# Patient Record
Sex: MALE | Race: WHITE | NOT HISPANIC OR LATINO | Employment: OTHER | URBAN - METROPOLITAN AREA
[De-identification: names, ages, dates, MRNs, and addresses within clinical notes are randomized per-mention and may not be internally consistent; named-entity substitution may affect disease eponyms.]

---

## 2024-01-18 PROBLEM — M79.604 LOW BACK PAIN RADIATING TO RIGHT LOWER EXTREMITY: Status: ACTIVE | Noted: 2024-01-18

## 2024-01-18 PROBLEM — S46.912A STRAIN OF LEFT SHOULDER: Status: ACTIVE | Noted: 2019-07-01

## 2024-01-18 PROBLEM — Z86.0100 HISTORY OF COLON POLYPS: Status: ACTIVE | Noted: 2024-01-18

## 2024-06-25 ENCOUNTER — OFFICE VISIT (OUTPATIENT)
Dept: PAIN MEDICINE | Facility: CLINIC | Age: 74
End: 2024-06-25
Payer: MEDICARE

## 2024-06-25 VITALS
RESPIRATION RATE: 15 BRPM | BODY MASS INDEX: 31.41 KG/M2 | TEMPERATURE: 97.3 F | HEART RATE: 67 BPM | DIASTOLIC BLOOD PRESSURE: 70 MMHG | WEIGHT: 160 LBS | SYSTOLIC BLOOD PRESSURE: 147 MMHG | OXYGEN SATURATION: 98 %

## 2024-06-25 DIAGNOSIS — M79.604 LOW BACK PAIN RADIATING TO RIGHT LOWER EXTREMITY: Primary | ICD-10-CM

## 2024-06-25 DIAGNOSIS — G89.29 CHRONIC BILATERAL LOW BACK PAIN WITH BILATERAL SCIATICA: ICD-10-CM

## 2024-06-25 DIAGNOSIS — M54.41 CHRONIC BILATERAL LOW BACK PAIN WITH BILATERAL SCIATICA: ICD-10-CM

## 2024-06-25 DIAGNOSIS — M51.26 LUMBAR DISC HERNIATION: ICD-10-CM

## 2024-06-25 DIAGNOSIS — M54.50 LOW BACK PAIN RADIATING TO RIGHT LOWER EXTREMITY: Primary | ICD-10-CM

## 2024-06-25 DIAGNOSIS — M54.42 CHRONIC BILATERAL LOW BACK PAIN WITH BILATERAL SCIATICA: ICD-10-CM

## 2024-06-25 PROCEDURE — 99214 OFFICE O/P EST MOD 30 MIN: CPT | Performed by: ANESTHESIOLOGY

## 2024-06-25 ASSESSMENT — ENCOUNTER SYMPTOMS
OCCASIONAL FEELINGS OF UNSTEADINESS: 0
LOSS OF SENSATION IN FEET: 0

## 2024-06-25 ASSESSMENT — PAIN SCALES - GENERAL: PAINLEVEL: 0-NO PAIN

## 2024-06-25 NOTE — PROGRESS NOTES
"History Of Present Illness  Dante López \"Avelina" is a 73 y.o. male presenting with   Chief Complaint   Patient presents with    Follow-up     Patient FOLLOWS up for significantly improved chronic low back pain to the RLE posteriorly.     Recall the pt reported his pain started in Oct/Nov of 2023. 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 The pain was constant, worse with activity and better with rest. The pain is sharp, stabbing and shooting to the B/L LE. Denies LE paresthesias, weakness, saddle anesthesia, bowel or bladder incontinence. To manage this pain the patient has attempted Gabapentin 100mg TID which was helpful initially and then wore off. The patients chronic DLD, HTN, NIDDM are stable on medication mgmt     PAIN SCORE: 0/10 was a 7/10.      PCP: Dr. Herring      PSHx  -None      SocHx  -Owned his own [employer] business  -Quit Tob in 1980's did smoke for 20 years   -Occ wine     Past Medical History  He has a past medical history of Personal history of other diseases of the circulatory system and Personal history of other endocrine, nutritional and metabolic disease.    Surgical History  He has a past surgical history that includes Other surgical history (08/07/2020).     Social History  He reports that he has never smoked. He has never used smokeless tobacco. He reports current alcohol use of about 21.0 standard drinks of alcohol per week. He reports current drug use. Drug: Marijuana.    Family History  No family history on file.     Allergies  Patient has no known allergies.    Review of Systems    All other systems reviewed and negative for any deficits. Pertinent positives and negatives were considered in the medical decision making process.        Physical Exam  /70   Pulse 67   Temp 36.3 °C (97.3 °F)   Resp 15   Wt 72.6 kg (160 lb)   SpO2 98%     General: Pt appears stated age     Eyes: Conjunctiva non-icteric and lids without " obvious rash or drooping. Pupils are symmetric     ENT: External ears are without deformity or rash. Hearing is grossly intact     Neck: No JVD noted, tracheal position midline.      Respiratory: No gasping or shortness of breath noted, no use of accessory muscles noted     Cardiovascular: Extremities show no edema or varicosities     Skin: No rashes or open lesions/ulcers identified on skin.     Musculoskeletal: Gait is grossly normal     Digits/nails show no clubbing or cyanosis     Exam of muscles/joints/bones shows no gross atrophy and no abnormal/involuntary movements in the head/neckNo asymmetry or masses noted in the head/neck     Stability: no subluxation noted on movement of bilateral upper extremities or head/neck     Strength: 5/5 in B/L upper and lower extremities      Range of Motion: WNL      Sensation: In tact      Cranial nerves 2-12 are grossly intact     Psychiatric: Pt is alert and oriented to time, place and person.          Assessment/Plan   1. Low back pain radiating to right lower extremity        2. Lumbar disc herniation        3. Chronic bilateral low back pain with bilateral sciatica              Diagnoses/Problems     · Encounter for preventive health examination (V70.0) (Z00.00)     · Lumbar neuritis (724.4) (M54.16)   · Lumbar disc herniation (722.10) (M51.26)   · Chronic low back pain (724.2,338.29) (M54.50,G89.29)     Provider Impressions     1. I have provided the patient with a list of physical therapy exercises to learn and perform to strengthen core, maintain stabilization, and reduce pain. We reviewed the exercises in detail and I encouraged them to perform them on a regular basis.     2. We discussed his Gabapentin 100mg TID to help with nerve related pain. We discussed the risks, benefits, and side effects to this medication including the mechanism of action and the pt understands and Will hold off for now since his radicular pain has decreased.      3. I extensively reviewed  the patients MRI findings in detail, including review of the actual images and provided a detailed explanation of the findings using a spine model.      There are right sided disc herniations at the L4/5 and L5/S1 level with S1 nerve root abutment. There is also grade I anterolisthesis at L3/4 and L2/3.      4. The patient is a candidate for an bilateral L5 LTR VERSUS  LESI at L5/S1 to treat back and radicular pain. I spent time with the patient discussing all of the risks, benefits, and alternatives to this measure. Including but not limited to spinal infection, epidural hematoma/abscess, paralysis, nerve injury, steroid effects, and spinal headache. The patient understands and agrees to proceed.    He underwent a RIGHT LTR ON 5- and he reported 100% relief of his pain that is ONGOING for now. Prior to that he underwent an LESI at L5/S1 on 3-1-2024 and he reported 100% relief of his pain that lasted several months and gradually wore off.      I spent time with the patient reviewing their imaging and discussing the risks benefits and alternatives to the above plan. A total of 30 minutes was spent reviewing the data and greater than 50% of that time was with the patient during the face to face encounter discussing treatment options both surgical, non-surgical, and minimally invasive techniques.    Haseeb Posey MD

## 2024-09-19 ENCOUNTER — LAB (OUTPATIENT)
Dept: LAB | Facility: LAB | Age: 74
End: 2024-09-19
Payer: MEDICARE

## 2024-09-19 DIAGNOSIS — E11.649 TYPE 2 DIABETES MELLITUS WITH HYPOGLYCEMIA WITHOUT COMA: ICD-10-CM

## 2024-09-19 DIAGNOSIS — I10 ESSENTIAL (PRIMARY) HYPERTENSION: ICD-10-CM

## 2024-09-19 DIAGNOSIS — E78.5 HYPERLIPIDEMIA, UNSPECIFIED: Primary | ICD-10-CM

## 2024-09-19 LAB
ANION GAP SERPL CALC-SCNC: 13 MMOL/L (ref 10–20)
BUN SERPL-MCNC: 13 MG/DL (ref 6–23)
CA-I BLD-SCNC: 1.2 MMOL/L (ref 1.1–1.33)
CALCIUM SERPL-MCNC: 9.4 MG/DL (ref 8.6–10.6)
CHLORIDE SERPL-SCNC: 99 MMOL/L (ref 98–107)
CHOLEST SERPL-MCNC: 147 MG/DL (ref 0–199)
CHOLESTEROL/HDL RATIO: 2.5
CO2 SERPL-SCNC: 29 MMOL/L (ref 21–32)
CREAT SERPL-MCNC: 0.84 MG/DL (ref 0.5–1.3)
CREAT UR-MCNC: 96.3 MG/DL (ref 20–370)
EGFRCR SERPLBLD CKD-EPI 2021: >90 ML/MIN/1.73M*2
GLUCOSE SERPL-MCNC: 152 MG/DL (ref 74–99)
HDLC SERPL-MCNC: 58.1 MG/DL
LDLC SERPL CALC-MCNC: 64 MG/DL
MICROALBUMIN UR-MCNC: 10 MG/L
MICROALBUMIN/CREAT UR: 10.4 UG/MG CREAT
NON HDL CHOLESTEROL: 89 MG/DL (ref 0–149)
POTASSIUM SERPL-SCNC: 3.6 MMOL/L (ref 3.5–5.3)
SODIUM SERPL-SCNC: 137 MMOL/L (ref 136–145)
TRIGL SERPL-MCNC: 123 MG/DL (ref 0–149)
VLDL: 25 MG/DL (ref 0–40)

## 2024-09-19 PROCEDURE — 82330 ASSAY OF CALCIUM: CPT

## 2024-09-19 PROCEDURE — 80048 BASIC METABOLIC PNL TOTAL CA: CPT

## 2024-09-19 PROCEDURE — 83036 HEMOGLOBIN GLYCOSYLATED A1C: CPT

## 2024-09-19 PROCEDURE — 82570 ASSAY OF URINE CREATININE: CPT

## 2024-09-19 PROCEDURE — 82043 UR ALBUMIN QUANTITATIVE: CPT

## 2024-09-19 PROCEDURE — 80061 LIPID PANEL: CPT

## 2024-09-19 PROCEDURE — 36415 COLL VENOUS BLD VENIPUNCTURE: CPT

## 2024-09-20 LAB
EST. AVERAGE GLUCOSE BLD GHB EST-MCNC: 148 MG/DL
HBA1C MFR BLD: 6.8 %

## 2025-01-05 ENCOUNTER — HOSPITAL ENCOUNTER (EMERGENCY)
Facility: HOSPITAL | Age: 75
Discharge: HOME | End: 2025-01-05
Payer: MEDICARE

## 2025-01-05 ENCOUNTER — APPOINTMENT (OUTPATIENT)
Dept: RADIOLOGY | Facility: HOSPITAL | Age: 75
End: 2025-01-05
Payer: MEDICARE

## 2025-01-05 VITALS
DIASTOLIC BLOOD PRESSURE: 87 MMHG | OXYGEN SATURATION: 95 % | RESPIRATION RATE: 20 BRPM | TEMPERATURE: 97.3 F | HEIGHT: 63 IN | BODY MASS INDEX: 27.62 KG/M2 | SYSTOLIC BLOOD PRESSURE: 175 MMHG | WEIGHT: 155.87 LBS | HEART RATE: 100 BPM

## 2025-01-05 DIAGNOSIS — J20.9 ACUTE BRONCHITIS, UNSPECIFIED ORGANISM: Primary | ICD-10-CM

## 2025-01-05 LAB
FLUAV RNA RESP QL NAA+PROBE: NOT DETECTED
FLUBV RNA RESP QL NAA+PROBE: NOT DETECTED
RSV RNA RESP QL NAA+PROBE: NOT DETECTED
SARS-COV-2 RNA RESP QL NAA+PROBE: NOT DETECTED

## 2025-01-05 PROCEDURE — 71046 X-RAY EXAM CHEST 2 VIEWS: CPT

## 2025-01-05 PROCEDURE — 87637 SARSCOV2&INF A&B&RSV AMP PRB: CPT | Performed by: NURSE PRACTITIONER

## 2025-01-05 PROCEDURE — 2500000001 HC RX 250 WO HCPCS SELF ADMINISTERED DRUGS (ALT 637 FOR MEDICARE OP): Performed by: NURSE PRACTITIONER

## 2025-01-05 PROCEDURE — 2500000004 HC RX 250 GENERAL PHARMACY W/ HCPCS (ALT 636 FOR OP/ED): Performed by: NURSE PRACTITIONER

## 2025-01-05 PROCEDURE — 94640 AIRWAY INHALATION TREATMENT: CPT

## 2025-01-05 PROCEDURE — 99284 EMERGENCY DEPT VISIT MOD MDM: CPT | Mod: 25

## 2025-01-05 PROCEDURE — 2500000002 HC RX 250 W HCPCS SELF ADMINISTERED DRUGS (ALT 637 FOR MEDICARE OP, ALT 636 FOR OP/ED): Mod: MUE | Performed by: NURSE PRACTITIONER

## 2025-01-05 RX ORDER — ALBUTEROL SULFATE 90 UG/1
2 INHALANT RESPIRATORY (INHALATION) EVERY 4 HOURS PRN
Qty: 18 G | Refills: 0 | Status: SHIPPED | OUTPATIENT
Start: 2025-01-05 | End: 2025-02-04

## 2025-01-05 RX ORDER — PREDNISONE 20 MG/1
40 TABLET ORAL DAILY
Qty: 10 TABLET | Refills: 0 | Status: SHIPPED | OUTPATIENT
Start: 2025-01-05 | End: 2025-01-10

## 2025-01-05 RX ORDER — PREDNISONE 20 MG/1
60 TABLET ORAL ONCE
Status: COMPLETED | OUTPATIENT
Start: 2025-01-05 | End: 2025-01-05

## 2025-01-05 RX ORDER — BENZONATATE 100 MG/1
100 CAPSULE ORAL EVERY 8 HOURS
Qty: 9 CAPSULE | Refills: 0 | Status: SHIPPED | OUTPATIENT
Start: 2025-01-05 | End: 2025-01-08

## 2025-01-05 RX ORDER — IPRATROPIUM BROMIDE AND ALBUTEROL SULFATE 2.5; .5 MG/3ML; MG/3ML
3 SOLUTION RESPIRATORY (INHALATION) ONCE
Status: COMPLETED | OUTPATIENT
Start: 2025-01-05 | End: 2025-01-05

## 2025-01-05 RX ORDER — BENZONATATE 100 MG/1
100 CAPSULE ORAL ONCE
Status: COMPLETED | OUTPATIENT
Start: 2025-01-05 | End: 2025-01-05

## 2025-01-05 RX ADMIN — IPRATROPIUM BROMIDE AND ALBUTEROL SULFATE 3 ML: .5; 3 SOLUTION RESPIRATORY (INHALATION) at 09:22

## 2025-01-05 RX ADMIN — BENZONATATE 100 MG: 100 CAPSULE ORAL at 09:12

## 2025-01-05 RX ADMIN — PREDNISONE 60 MG: 20 TABLET ORAL at 09:12

## 2025-01-05 ASSESSMENT — COLUMBIA-SUICIDE SEVERITY RATING SCALE - C-SSRS
2. HAVE YOU ACTUALLY HAD ANY THOUGHTS OF KILLING YOURSELF?: NO
1. IN THE PAST MONTH, HAVE YOU WISHED YOU WERE DEAD OR WISHED YOU COULD GO TO SLEEP AND NOT WAKE UP?: NO
6. HAVE YOU EVER DONE ANYTHING, STARTED TO DO ANYTHING, OR PREPARED TO DO ANYTHING TO END YOUR LIFE?: NO

## 2025-01-05 ASSESSMENT — PAIN - FUNCTIONAL ASSESSMENT: PAIN_FUNCTIONAL_ASSESSMENT: 0-10

## 2025-01-05 ASSESSMENT — PAIN SCALES - GENERAL: PAINLEVEL_OUTOF10: 0 - NO PAIN

## 2025-01-05 NOTE — ED PROVIDER NOTES
Limitations to History: None     HPI:      Dante López is a 74 y.o. male with significant PMH for HTN, HLD and DM presenting to ED today from home with wife for evaluation of a cough.  For last 10 days the patient has had a cough that is occasionally productive of yellow or green phlegm.  Endorses wheezing and nasal congestion.  Unknown sick contacts denies fever/chills, sore throat, chest pain, shortness of breath, nausea/vomiting, abdominal pain, urinary symptoms, change in bowel habits or any other complaints.  No smoking, EtOH or IV drugs.  PCP is Dr. Herring.     Additional History Obtained from: Triage/nursing notes reviewed.    ------------------------------------------------------------------------------------------------------------------------------------------    VS: As documented in the triage note and EMR flowsheet from this visit were reviewed.    Physical Exam:  Gen: 74 male awake and alert, orient x 3.  Well-nourished and hydrated.  Intermittent coughing.  Nontoxic looking.  Head/Neck: NCAT, neck w/ FROM.  No lymphadenopathy.  Eyes: EOMI, PERRL, anicteric sclerae, noninjected conjunctivae  Ears: Bilateral EACs are clear, bilateral TMs intact with visible landmarks..  Nose: Nares patent w/o rhinorrhea  Mouth:  MMM, no OP lesions noted.  Posterior oropharynx is clear.  Uvula midline.  Heart: RRR no MRG  Lungs: Wheezing on expiration throughout all fields, no rales or rhonchi.  No accessory muscle use or stridor.  Abdomen: soft, NT, ND, no HSM, no palpable masses  Musculoskeletal: JENNA x 4.Intact.  Skin intact.  No deformities.  Neurologic: Alert, symmetrical facies, phonates clearly, moves all extremities equally, responsive to touch, ambulates normally   Skin: Pink, warm dry.  No erythema, edema or ecchymosis no rashes noted  Psychological: calm, no  SI/HI    ------------------------------------------------------------------------------------------------------------------------------------------    Medical Decision Makin y.o. male with significant PMH for HTN, HLD and DM is evaluated at the bedside for 10 days of an occasionally productive cough and nasal congestion.  On arrival to the ED, vital signs within normal limits.  Afebrile.  Wheezing on expiration throughout all fields.  Abdomen soft and nontender.    Differential includes but is not limited to COVID, influenza, RSV, pneumonia and bronchitis.    Viral swabs and chest x-ray will be obtained.  DuoNeb treatment and p.o. prednisone/p.o. Tessalon Perles.      ED Course as of 25 1023   Sun 2025   1018 Laboratory studies were reviewed, RSV, influenza and COVID are all negative.  Chest x-ray does not show any evidence of pneumonia.  Feels improved after breathing treatment and oral medications in the emergency department.  Repeat vital signs within normal limits.  Final diagnosis today is acute bronchitis.  This will be treated symptomatically with albuterol inhaler, Tessalon Perles for cough and 5-day steroid burst.  We discussed the fact that this could elevate blood sugars temporarily.  Patient verbalizes understanding.  Shared decision making conducted.  Discharge home.  Follow-up with PCP in the next 2 to 3 days, call on Monday to set up follow-up appointment.  Stay well-hydrated.  Tylenol for pain.  Resume normal medications.  Return precautions and red flags discussed.  All questions were entertained and answered.  Patient verbalizes understanding of diagnosis and treatment plan.  Condition stable for discharge. [SB]      ED Course User Index  [SB] Narda Lal, APRN-CNP         Diagnoses as of 25 1023   Acute bronchitis, unspecified organism       EKG interpreted by myself (ED attending physician): None    Chronic Medical Conditions Significantly Affecting Care:  None    External Records Reviewed: I reviewed recent and relevant outside records including: None    Discussion of Management with Other Providers: None    I discussed the patient/results with: None       TONIO Raymond-CNP  01/05/25 1023

## 2025-01-05 NOTE — DISCHARGE INSTRUCTIONS
No evidence of pneumonia, COVID, RSV and influenza are all negative.  You have acute bronchitis which is viral in nature.  Albuterol inhaler for cough/wheeze.  Tessalon Perles for cough.  3-day supply of steroids be aware this could raise your blood sugars temporarily.  Resume normal medications.  Call PCP Monday to set up follow-up appointment in the next 2 to 3 days.  Increase fluids.  Rest.  Tylenol for pain.  Return to the emergency room symptoms worsen.

## 2025-01-05 NOTE — ED TRIAGE NOTES
Patient present to the emergency room c/o  chest congestion since christmas. Patient states he came in today because the cough is not getting better